# Patient Record
Sex: MALE | Race: WHITE | Employment: FULL TIME | URBAN - METROPOLITAN AREA
[De-identification: names, ages, dates, MRNs, and addresses within clinical notes are randomized per-mention and may not be internally consistent; named-entity substitution may affect disease eponyms.]

---

## 2017-09-05 ENCOUNTER — OFFICE VISIT (OUTPATIENT)
Dept: OTHER | Facility: HOSPITAL | Age: 22
End: 2017-09-05
Attending: ORTHOPAEDIC SURGERY

## 2017-09-05 DIAGNOSIS — Z00.00 ROUTINE GENERAL MEDICAL EXAMINATION AT A HEALTH CARE FACILITY: Primary | ICD-10-CM

## 2017-09-05 LAB
ALBUMIN SERPL BCP-MCNC: 4.5 G/DL (ref 3.5–4.8)
ALBUMIN/GLOB SERPL: 1.5 {RATIO} (ref 1–2)
ALP SERPL-CCNC: 76 U/L (ref 32–100)
ALT SERPL-CCNC: 26 U/L (ref 17–63)
ANION GAP SERPL CALC-SCNC: 8 MMOL/L (ref 0–18)
AST SERPL-CCNC: 25 U/L (ref 15–41)
BASOPHILS # BLD: 0 K/UL (ref 0–0.2)
BASOPHILS NFR BLD: 1 %
BILIRUB SERPL-MCNC: 1.1 MG/DL (ref 0.3–1.2)
BILIRUB UR QL: NEGATIVE
BUN SERPL-MCNC: 14 MG/DL (ref 8–20)
BUN/CREAT SERPL: 10.4 (ref 10–20)
CALCIUM SERPL-MCNC: 9.7 MG/DL (ref 8.5–10.5)
CHLORIDE SERPL-SCNC: 104 MMOL/L (ref 95–110)
CHOLEST SERPL-MCNC: 165 MG/DL (ref 110–200)
CLARITY UR: CLEAR
CO2 SERPL-SCNC: 25 MMOL/L (ref 22–32)
COLOR UR: YELLOW
CREAT SERPL-MCNC: 1.35 MG/DL (ref 0.5–1.5)
EOSINOPHIL # BLD: 0.2 K/UL (ref 0–0.7)
EOSINOPHIL NFR BLD: 4 %
ERYTHROCYTE [DISTWIDTH] IN BLOOD BY AUTOMATED COUNT: 16.5 % (ref 11–15)
GLOBULIN PLAS-MCNC: 3 G/DL (ref 2.5–3.7)
GLUCOSE SERPL-MCNC: 102 MG/DL (ref 70–99)
GLUCOSE UR-MCNC: NEGATIVE MG/DL
HCT VFR BLD AUTO: 46.6 % (ref 41–52)
HDLC SERPL-MCNC: 59 MG/DL
HGB BLD-MCNC: 15.7 G/DL (ref 13.5–17.5)
HGB UR QL STRIP.AUTO: NEGATIVE
KETONES UR-MCNC: NEGATIVE MG/DL
LDLC SERPL CALC-MCNC: 85 MG/DL (ref 0–99)
LEUKOCYTE ESTERASE UR QL STRIP.AUTO: NEGATIVE
LYMPHOCYTES # BLD: 1.6 K/UL (ref 1–4)
LYMPHOCYTES NFR BLD: 26 %
MCH RBC QN AUTO: 27.5 PG (ref 27–32)
MCHC RBC AUTO-ENTMCNC: 33.6 G/DL (ref 32–37)
MCV RBC AUTO: 82 FL (ref 80–100)
MONOCYTES # BLD: 0.6 K/UL (ref 0–1)
MONOCYTES NFR BLD: 11 %
NEUTROPHILS # BLD AUTO: 3.6 K/UL (ref 1.8–7.7)
NEUTROPHILS NFR BLD: 59 %
NITRITE UR QL STRIP.AUTO: NEGATIVE
NONHDLC SERPL-MCNC: 106 MG/DL
OSMOLALITY UR CALC.SUM OF ELEC: 285 MOSM/KG (ref 275–295)
PH UR: 6 [PH] (ref 5–8)
PLATELET # BLD AUTO: 211 K/UL (ref 140–400)
PMV BLD AUTO: 7.6 FL (ref 7.4–10.3)
POTASSIUM SERPL-SCNC: 4.4 MMOL/L (ref 3.3–5.1)
PROT SERPL-MCNC: 7.5 G/DL (ref 5.9–8.4)
PROT UR-MCNC: NEGATIVE MG/DL
RBC # BLD AUTO: 5.68 M/UL (ref 4.5–5.9)
SODIUM SERPL-SCNC: 137 MMOL/L (ref 136–144)
SP GR UR STRIP: 1 (ref 1–1.03)
TRIGL SERPL-MCNC: 106 MG/DL (ref 1–149)
UROBILINOGEN UR STRIP-ACNC: <2
VIT C UR-MCNC: NEGATIVE MG/DL
WBC # BLD AUTO: 6 K/UL (ref 4–11)

## 2017-09-05 PROCEDURE — 80053 COMPREHEN METABOLIC PANEL: CPT

## 2017-09-05 PROCEDURE — 85025 COMPLETE CBC W/AUTO DIFF WBC: CPT

## 2017-09-05 PROCEDURE — 80061 LIPID PANEL: CPT

## 2017-09-05 PROCEDURE — 81003 URINALYSIS AUTO W/O SCOPE: CPT

## 2018-08-13 ENCOUNTER — HOSPITAL (OUTPATIENT)
Dept: OTHER | Age: 23
End: 2018-08-13
Attending: FAMILY MEDICINE

## 2020-02-05 ENCOUNTER — OFFICE VISIT (OUTPATIENT)
Dept: FAMILY MEDICINE CLINIC | Facility: CLINIC | Age: 25
End: 2020-02-05
Payer: COMMERCIAL

## 2020-02-05 VITALS
WEIGHT: 207 LBS | BODY MASS INDEX: 28.98 KG/M2 | HEIGHT: 71 IN | DIASTOLIC BLOOD PRESSURE: 62 MMHG | HEART RATE: 59 BPM | SYSTOLIC BLOOD PRESSURE: 102 MMHG

## 2020-02-05 DIAGNOSIS — J35.01 CHRONIC TONSILLITIS: ICD-10-CM

## 2020-02-05 DIAGNOSIS — R06.83 SNORING: ICD-10-CM

## 2020-02-05 DIAGNOSIS — Z00.00 WELL ADULT EXAM: Primary | ICD-10-CM

## 2020-02-05 PROCEDURE — 90686 IIV4 VACC NO PRSV 0.5 ML IM: CPT | Performed by: NURSE PRACTITIONER

## 2020-02-05 PROCEDURE — 99385 PREV VISIT NEW AGE 18-39: CPT | Performed by: NURSE PRACTITIONER

## 2020-02-05 PROCEDURE — 90471 IMMUNIZATION ADMIN: CPT | Performed by: NURSE PRACTITIONER

## 2020-02-05 NOTE — PATIENT INSTRUCTIONS
Prevention Guidelines, Men Ages 25 to 44  Screening tests and vaccines are an important part of managing your health. A screening test is done to find possible disorders or diseases in people who don't have any symptoms.  The goal is to find a disease ear Vaccines Who needs it How often   Chickenpox (varicella) All men in this age group who have no record of this infection or vaccine 2 doses; the second dose should be given at least 4 weeks after the first dose   Hepatitis A Men at increased risk for infect Sexually transmitted infection prevention Men who are sexually active At routine exams   Skin cancer Prevention of skin cancer in fair-skinned adults through age 25 At routine exams   1Those who are 25years of age, who are not up-to-date on their childhoo Too much weight can make snoring worse. Extra weight puts pressure on your neck tissues and lungs, making breathing harder.  If you're overweight, ask your healthcare provider about a weight-loss program.  Exercise regularly  Exercise can help you lose weig

## 2020-02-05 NOTE — PROGRESS NOTES
HPI  Pt here to establish care. Has h/o snoring over the past couple of years-not every day. Most days wakes up feeling tired. Will wake up at times gasping. No other health problems or concerns.    Had lasik on eyes    Diet-has been working on a healt Occupation: student    Social Needs      Financial resource strain: Not on file      Food insecurity:        Worry: Not on file        Inability: Not on file      Transportation needs:        Medical: Not on file        Non-medical: Not on file    Tobacco cerumen present  Left Ear: Tympanic membrane and ear canal normal. No cerumen present  Mouth/Throat: Uvula is midline. No oropharyngeal exudate, posterior oropharyngeal erythema or tonsillar abscesses.    Tonsils 3+ bilat   Eyes: Pupils are equal, round, an to call with questions or concerns. Encouraged to sign up for My Chart if not already registered.

## 2020-02-05 NOTE — ASSESSMENT & PLAN NOTE
Had full labs and stress test done July 2019  Please aim to eat a diet high in fresh fruits and vegetables, lean protein sources, complex carbohydrates and limited processed and fast foods.   Try to get at least 150 minutes of exercise per week-a combinatio

## 2020-10-21 PROCEDURE — 88304 TISSUE EXAM BY PATHOLOGIST: CPT | Performed by: OTOLARYNGOLOGY

## 2023-09-30 ENCOUNTER — HOSPITAL ENCOUNTER (EMERGENCY)
Age: 28
Discharge: HOME | End: 2023-10-01
Attending: EMERGENCY MEDICINE

## 2023-09-30 ENCOUNTER — APPOINTMENT (OUTPATIENT)
Dept: GENERAL RADIOLOGY | Age: 28
End: 2023-09-30
Attending: EMERGENCY MEDICINE

## 2023-09-30 DIAGNOSIS — S93.401A SPRAIN OF RIGHT ANKLE, UNSPECIFIED LIGAMENT, INITIAL ENCOUNTER: Primary | ICD-10-CM

## 2023-09-30 PROCEDURE — 73610 X-RAY EXAM OF ANKLE: CPT

## 2023-09-30 PROCEDURE — 99283 EMERGENCY DEPT VISIT LOW MDM: CPT

## 2023-09-30 ASSESSMENT — PAIN SCALES - GENERAL: PAINLEVEL_OUTOF10: 7

## 2023-09-30 ASSESSMENT — PAIN DESCRIPTION - PAIN TYPE: TYPE: ACUTE PAIN

## 2023-10-01 VITALS
WEIGHT: 192.9 LBS | SYSTOLIC BLOOD PRESSURE: 123 MMHG | RESPIRATION RATE: 16 BRPM | BODY MASS INDEX: 36.42 KG/M2 | HEIGHT: 61 IN | DIASTOLIC BLOOD PRESSURE: 72 MMHG | TEMPERATURE: 97.2 F | OXYGEN SATURATION: 100 % | HEART RATE: 72 BPM

## 2023-10-01 PROBLEM — J35.01 CHRONIC TONSILLITIS: Status: ACTIVE | Noted: 2020-02-05

## 2023-10-01 PROBLEM — R06.83 SNORING: Status: ACTIVE | Noted: 2020-02-05

## 2023-10-01 PROCEDURE — 99283 EMERGENCY DEPT VISIT LOW MDM: CPT | Performed by: EMERGENCY MEDICINE

## 2023-10-01 PROCEDURE — 10002803 HB RX 637: Performed by: EMERGENCY MEDICINE

## 2023-10-01 RX ORDER — IBUPROFEN 200 MG
600 TABLET ORAL ONCE
Status: COMPLETED | OUTPATIENT
Start: 2023-10-01 | End: 2023-10-01

## 2023-10-01 RX ADMIN — IBUPROFEN 600 MG: 200 TABLET, FILM COATED ORAL at 00:22

## 2023-10-01 ASSESSMENT — PAIN SCALES - GENERAL: PAINLEVEL_OUTOF10: 7

## 2024-04-10 ENCOUNTER — APPOINTMENT (OUTPATIENT)
Dept: GENERAL RADIOLOGY | Age: 29
End: 2024-04-10

## 2024-04-10 ENCOUNTER — HOSPITAL ENCOUNTER (EMERGENCY)
Age: 29
Discharge: HOME | End: 2024-04-10
Attending: EMERGENCY MEDICINE

## 2024-04-10 VITALS
TEMPERATURE: 98 F | SYSTOLIC BLOOD PRESSURE: 138 MMHG | RESPIRATION RATE: 16 BRPM | BODY MASS INDEX: 26.9 KG/M2 | HEART RATE: 69 BPM | HEIGHT: 71 IN | DIASTOLIC BLOOD PRESSURE: 77 MMHG | OXYGEN SATURATION: 99 %

## 2024-04-10 DIAGNOSIS — S61.210A LACERATION OF RIGHT INDEX FINGER WITHOUT FOREIGN BODY WITHOUT DAMAGE TO NAIL, INITIAL ENCOUNTER: Primary | ICD-10-CM

## 2024-04-10 PROCEDURE — 73140 X-RAY EXAM OF FINGER(S): CPT

## 2024-04-10 PROCEDURE — 10002800 HB RX 250 W HCPCS: Performed by: PHYSICIAN ASSISTANT

## 2024-04-10 PROCEDURE — 10002801 HB RX 250 W/O HCPCS

## 2024-04-10 PROCEDURE — 90715 TDAP VACCINE 7 YRS/> IM: CPT | Performed by: PHYSICIAN ASSISTANT

## 2024-04-10 PROCEDURE — 90471 IMMUNIZATION ADMIN: CPT | Performed by: PHYSICIAN ASSISTANT

## 2024-04-10 RX ORDER — LIDOCAINE HYDROCHLORIDE 10 MG/ML
INJECTION, SOLUTION INFILTRATION; PERINEURAL
Status: COMPLETED
Start: 2024-04-10 | End: 2024-04-10

## 2024-04-10 RX ADMIN — TETANUS TOXOID, REDUCED DIPHTHERIA TOXOID AND ACELLULAR PERTUSSIS VACCINE, ADSORBED 0.5 ML: 5; 2.5; 8; 8; 2.5 SUSPENSION INTRAMUSCULAR at 19:26

## 2024-04-10 RX ADMIN — LIDOCAINE HYDROCHLORIDE 10 ML: 10 INJECTION, SOLUTION INFILTRATION; PERINEURAL at 19:05

## 2024-04-10 ASSESSMENT — ENCOUNTER SYMPTOMS
WEAKNESS: 0
NUMBNESS: 0

## 2024-04-12 ENCOUNTER — CASE MANAGEMENT (OUTPATIENT)
Dept: OCCUPATIONAL MEDICINE | Age: 29
End: 2024-04-12

## 2024-04-19 ENCOUNTER — WORKER'S COMP (OUTPATIENT)
Dept: OCCUPATIONAL MEDICINE | Age: 29
End: 2024-04-19

## 2024-04-19 VITALS
DIASTOLIC BLOOD PRESSURE: 60 MMHG | TEMPERATURE: 99 F | WEIGHT: 192 LBS | HEIGHT: 71 IN | SYSTOLIC BLOOD PRESSURE: 100 MMHG | OXYGEN SATURATION: 99 % | BODY MASS INDEX: 26.88 KG/M2 | RESPIRATION RATE: 16 BRPM | HEART RATE: 52 BPM

## 2024-04-19 DIAGNOSIS — S61.210A LACERATION OF RIGHT INDEX FINGER WITHOUT FOREIGN BODY WITHOUT DAMAGE TO NAIL, INITIAL ENCOUNTER: Primary | ICD-10-CM

## 2024-04-19 ASSESSMENT — ENCOUNTER SYMPTOMS: WOUND: 1

## 2024-04-19 ASSESSMENT — PAIN SCALES - GENERAL: PAINLEVEL: 0

## 2024-04-26 ENCOUNTER — APPOINTMENT (OUTPATIENT)
Dept: OCCUPATIONAL MEDICINE | Age: 29
End: 2024-04-26

## 2024-04-26 VITALS
WEIGHT: 192 LBS | OXYGEN SATURATION: 98 % | RESPIRATION RATE: 16 BRPM | TEMPERATURE: 97.2 F | SYSTOLIC BLOOD PRESSURE: 102 MMHG | DIASTOLIC BLOOD PRESSURE: 62 MMHG | BODY MASS INDEX: 26.88 KG/M2 | HEIGHT: 71 IN | HEART RATE: 78 BPM

## 2024-04-26 DIAGNOSIS — S61.210D LACERATION OF RIGHT INDEX FINGER WITHOUT FOREIGN BODY WITHOUT DAMAGE TO NAIL, SUBSEQUENT ENCOUNTER: Primary | ICD-10-CM

## 2024-04-26 ASSESSMENT — PAIN SCALES - GENERAL: PAINLEVEL: 0

## 2024-04-29 ENCOUNTER — OFFICE VISIT (OUTPATIENT)
Dept: INTERNAL MEDICINE CLINIC | Facility: CLINIC | Age: 29
End: 2024-04-29
Payer: COMMERCIAL

## 2024-04-29 VITALS
DIASTOLIC BLOOD PRESSURE: 78 MMHG | WEIGHT: 181.81 LBS | TEMPERATURE: 98 F | BODY MASS INDEX: 26.03 KG/M2 | RESPIRATION RATE: 12 BRPM | SYSTOLIC BLOOD PRESSURE: 104 MMHG | HEIGHT: 70.25 IN | HEART RATE: 55 BPM | OXYGEN SATURATION: 100 %

## 2024-04-29 DIAGNOSIS — Z00.00 PREVENTATIVE HEALTH CARE: Primary | ICD-10-CM

## 2024-04-29 PROBLEM — S61.210A LACERATION OF RIGHT INDEX FINGER WITHOUT FOREIGN BODY WITHOUT DAMAGE TO NAIL: Status: RESOLVED | Noted: 2024-04-19 | Resolved: 2024-04-29

## 2024-04-29 PROBLEM — S61.210A LACERATION OF RIGHT INDEX FINGER WITHOUT FOREIGN BODY WITHOUT DAMAGE TO NAIL: Status: ACTIVE | Noted: 2024-04-19

## 2024-04-29 PROBLEM — J35.01 CHRONIC TONSILLITIS: Status: RESOLVED | Noted: 2020-02-05 | Resolved: 2024-04-29

## 2024-04-29 NOTE — PATIENT INSTRUCTIONS
- Get blood tests done when fasting (water and medications only for 8 hours).  You can schedule a lab appointment online through [employer] or rosi call central scheduling at [phone]  - When you have your work physical this summer you can let them know you had lab tests done through us (they may still want to repeat the labs, but as [facility] is [facility] of [facility] they can see our lab results).    It was a pleasure seeing you in the clinic today.  Thank you for choosing the [facility] office for your healthcare needs. Please call at [phone] with any questions or concerns.    Bonita Bender MD

## 2024-04-29 NOTE — PROGRESS NOTES
Ken Suarez Jr. is a 28 year old male.   HPI:     Chief Complaint   Patient presents with    New Patient    Establish Care    CPX     Patient presents for CPX/wellness examination and to establish care.  Diet: Generally pretty healthy  Exercise: Generally active, regular exercise  Vision: Wears contacts - had eye exam July 2023  Dental: Last cleaning was a week ago    Acute issues:  Had a fingernail laceration at work this month.  Saw occupational health clinic through [facility].  Did get tetanus shot from there this month.    Chronic issues:  None/not on any prescription medications.    Past medical, family, surgical and social history were reviewed and updated in chart.  REVIEW OF SYSTEMS:   GENERAL/ const: no fevers/chills, no unintentional weight loss  SKIN: denies any unusual skin lesions  EYES:no vision problems  HEENT: denies sinus pain or sinus tenderness  LUNGS: denies shortness of breath   CARDIOVASCULAR: denies chest pain  GI: denies nausea/emesis/ abdominal pain diarrhea constipation  : denies dysuria   MUSCULOSKELETAL: no arthralgias  NEURO: denies headaches  PSYCHIATRIC: denies issues  ENDOCRINE: no hot/cold intolerance  ALLERGY: No Known Allergies  PAST HISTORY:   No current outpatient medications on file.  Medical:  has a past medical history of Chronic tonsillitis (02/05/2020) and Laceration of right index finger without foreign body without damage to nail (04/19/2024).  Surgical:  has a past surgical history that includes LASIK and tonsillectomy (2020).  Family: family history is not on file.  Social:  reports that he has never smoked. He has never used smokeless tobacco. He reports current alcohol use. He reports that he does not use drugs.  Wt Readings from Last 6 Encounters:   04/29/24 181 lb 12.8 oz (82.5 kg)   10/21/20 206 lb (93.4 kg)   02/05/20 207 lb (93.9 kg)   02/15/16 190 lb (86.2 kg)   08/22/13 164 lb (74.4 kg) (72%, Z= 0.58)*   02/21/13 166 lb (75.3 kg) (77%, Z= 0.73)*     * Growth  percentiles are based on CDC (Boys, 2-20 Years) data.     EXAM:   /78 (BP Location: Right arm, Patient Position: Sitting, Cuff Size: large)   Pulse 55   Temp 98 °F (36.7 °C) (Temporal)   Resp 12   Ht 5' 10.25\" (1.784 m)   Wt 181 lb 12.8 oz (82.5 kg)   SpO2 100%   BMI 25.90 kg/m²   GENERAL: Alert and oriented, well developed, well nourished,in no apparent distress  SKIN: no rashes,no suspicious lesions  HEENT: atraumatic, PERRLA, EOMI, normal lid and conjunctiva, normal external canals and tympanic membranes bilaterally  NECK: supple, no jvd, no thyromegaly, no palpable/tender cervical lymphadenopathy  LUNGS: clear to auscultation bilaterally, no wheezing/rubs  CARDIO: RRR without murmurs.  No clubbing, cyanosis or edema.  GI: soft non tender nondistended no hepatosplenomegaly, bowel sounds throughout  NEURO: CN II-XII intact, 5/5 strength all extremities  MS: Full ROM, no joint pain  PSYCH: pleasant, appropriate mood and affect  ASSESSMENT AND PLAN:   1.  Encounter for preventative adult health examination  Age appropriate health guidance and counseling provided.  Screening labs ordered as below.  Body mass index is 25.9 kg/m².  He does get labs/work physical every summer, creatinine was slightly high last July.  Advised patient to do labs when fasting, no supplements/shakes beforehand.  - CBC With Differential With Platelet; Future  - Comp Metabolic Panel (14); Future  - Hemoglobin A1C; Future  - Lipid Panel; Future    Patient Care Team:  Judah Lacy MD as PCP - General (Internal Medicine)  The patient indicates understanding of these issues and agrees to the plan.  The patient is asked to return to clinic in 12 months for follow up on chronic issues/CPX, or earlier if acute issues arise.    Bonita Bender MD

## 2024-05-02 ENCOUNTER — APPOINTMENT (OUTPATIENT)
Dept: OCCUPATIONAL MEDICINE | Age: 29
End: 2024-05-02

## 2024-05-02 VITALS
OXYGEN SATURATION: 98 % | HEIGHT: 71 IN | RESPIRATION RATE: 16 BRPM | SYSTOLIC BLOOD PRESSURE: 112 MMHG | WEIGHT: 192 LBS | HEART RATE: 68 BPM | DIASTOLIC BLOOD PRESSURE: 70 MMHG | BODY MASS INDEX: 26.88 KG/M2 | TEMPERATURE: 98 F

## 2024-05-02 DIAGNOSIS — S61.210D LACERATION OF RIGHT INDEX FINGER WITHOUT FOREIGN BODY WITHOUT DAMAGE TO NAIL, SUBSEQUENT ENCOUNTER: Primary | ICD-10-CM

## 2024-05-02 PROCEDURE — 99213 OFFICE O/P EST LOW 20 MIN: CPT | Performed by: NURSE PRACTITIONER

## 2024-05-02 ASSESSMENT — PAIN SCALES - GENERAL: PAINLEVEL: 0

## 2024-05-03 ENCOUNTER — MED REC SCAN ONLY (OUTPATIENT)
Dept: INTERNAL MEDICINE CLINIC | Facility: CLINIC | Age: 29
End: 2024-05-03

## 2024-06-21 ENCOUNTER — LAB ENCOUNTER (OUTPATIENT)
Dept: LAB | Age: 29
End: 2024-06-21
Attending: INTERNAL MEDICINE

## 2024-06-21 DIAGNOSIS — Z00.00 PREVENTATIVE HEALTH CARE: ICD-10-CM

## 2024-06-21 LAB
ALBUMIN SERPL-MCNC: 4.8 G/DL (ref 3.2–4.8)
ALBUMIN/GLOB SERPL: 1.8 {RATIO} (ref 1–2)
ALP LIVER SERPL-CCNC: 75 U/L
ALT SERPL-CCNC: 33 U/L
ANION GAP SERPL CALC-SCNC: 8 MMOL/L (ref 0–18)
AST SERPL-CCNC: 36 U/L (ref ?–34)
BASOPHILS # BLD AUTO: 0.04 X10(3) UL (ref 0–0.2)
BASOPHILS NFR BLD AUTO: 0.4 %
BILIRUB SERPL-MCNC: 1 MG/DL (ref 0.3–1.2)
BUN BLD-MCNC: 28 MG/DL (ref 9–23)
BUN/CREAT SERPL: 17.8 (ref 10–20)
CALCIUM BLD-MCNC: 10.2 MG/DL (ref 8.7–10.4)
CHLORIDE SERPL-SCNC: 106 MMOL/L (ref 98–112)
CHOLEST SERPL-MCNC: 167 MG/DL (ref ?–200)
CO2 SERPL-SCNC: 27 MMOL/L (ref 21–32)
CREAT BLD-MCNC: 1.57 MG/DL
DEPRECATED RDW RBC AUTO: 38.5 FL (ref 35.1–46.3)
EGFRCR SERPLBLD CKD-EPI 2021: 61 ML/MIN/1.73M2 (ref 60–?)
EOSINOPHIL # BLD AUTO: 0.11 X10(3) UL (ref 0–0.7)
EOSINOPHIL NFR BLD AUTO: 1.2 %
ERYTHROCYTE [DISTWIDTH] IN BLOOD BY AUTOMATED COUNT: 12.4 % (ref 11–15)
EST. AVERAGE GLUCOSE BLD GHB EST-MCNC: 108 MG/DL (ref 68–126)
FASTING PATIENT LIPID ANSWER: NO
FASTING STATUS PATIENT QL REPORTED: NO
GLOBULIN PLAS-MCNC: 2.7 G/DL (ref 2–3.5)
GLUCOSE BLD-MCNC: 64 MG/DL (ref 70–99)
HBA1C MFR BLD: 5.4 % (ref ?–5.7)
HCT VFR BLD AUTO: 43.7 %
HDLC SERPL-MCNC: 67 MG/DL (ref 40–59)
HGB BLD-MCNC: 15.4 G/DL
IMM GRANULOCYTES # BLD AUTO: 0.03 X10(3) UL (ref 0–1)
IMM GRANULOCYTES NFR BLD: 0.3 %
LDLC SERPL CALC-MCNC: 78 MG/DL (ref ?–100)
LYMPHOCYTES # BLD AUTO: 1.77 X10(3) UL (ref 1–4)
LYMPHOCYTES NFR BLD AUTO: 18.8 %
MCH RBC QN AUTO: 30.1 PG (ref 26–34)
MCHC RBC AUTO-ENTMCNC: 35.2 G/DL (ref 31–37)
MCV RBC AUTO: 85.5 FL
MONOCYTES # BLD AUTO: 0.83 X10(3) UL (ref 0.1–1)
MONOCYTES NFR BLD AUTO: 8.8 %
NEUTROPHILS # BLD AUTO: 6.66 X10 (3) UL (ref 1.5–7.7)
NEUTROPHILS # BLD AUTO: 6.66 X10(3) UL (ref 1.5–7.7)
NEUTROPHILS NFR BLD AUTO: 70.5 %
NONHDLC SERPL-MCNC: 100 MG/DL (ref ?–130)
OSMOLALITY SERPL CALC.SUM OF ELEC: 296 MOSM/KG (ref 275–295)
PLATELET # BLD AUTO: 248 10(3)UL (ref 150–450)
POTASSIUM SERPL-SCNC: 4.3 MMOL/L (ref 3.5–5.1)
PROT SERPL-MCNC: 7.5 G/DL (ref 5.7–8.2)
RBC # BLD AUTO: 5.11 X10(6)UL
SODIUM SERPL-SCNC: 141 MMOL/L (ref 136–145)
TRIGL SERPL-MCNC: 130 MG/DL (ref 30–149)
VLDLC SERPL CALC-MCNC: 20 MG/DL (ref 0–30)
WBC # BLD AUTO: 9.4 X10(3) UL (ref 4–11)

## 2024-06-21 PROCEDURE — 85025 COMPLETE CBC W/AUTO DIFF WBC: CPT | Performed by: INTERNAL MEDICINE

## 2024-06-21 PROCEDURE — 83036 HEMOGLOBIN GLYCOSYLATED A1C: CPT | Performed by: INTERNAL MEDICINE

## 2024-06-21 PROCEDURE — 80053 COMPREHEN METABOLIC PANEL: CPT | Performed by: INTERNAL MEDICINE

## 2024-06-21 PROCEDURE — 80061 LIPID PANEL: CPT | Performed by: INTERNAL MEDICINE

## 2024-06-24 DIAGNOSIS — R79.89 ELEVATED SERUM CREATININE: Primary | ICD-10-CM

## 2024-06-24 PROBLEM — R74.8 ELEVATED CREATINE KINASE: Status: ACTIVE | Noted: 2024-06-24

## 2025-06-07 ENCOUNTER — OFFICE VISIT (OUTPATIENT)
Dept: INTERNAL MEDICINE CLINIC | Facility: CLINIC | Age: 30
End: 2025-06-07
Payer: COMMERCIAL

## 2025-06-07 VITALS
HEIGHT: 70 IN | RESPIRATION RATE: 12 BRPM | OXYGEN SATURATION: 99 % | DIASTOLIC BLOOD PRESSURE: 64 MMHG | TEMPERATURE: 98 F | BODY MASS INDEX: 25.91 KG/M2 | HEART RATE: 40 BPM | WEIGHT: 181 LBS | SYSTOLIC BLOOD PRESSURE: 114 MMHG

## 2025-06-07 DIAGNOSIS — I34.0 MITRAL VALVE INSUFFICIENCY, UNSPECIFIED ETIOLOGY: ICD-10-CM

## 2025-06-07 DIAGNOSIS — I37.1 PULMONARY VALVE INSUFFICIENCY, UNSPECIFIED ETIOLOGY: Primary | ICD-10-CM

## 2025-06-07 DIAGNOSIS — N13.30 HYDRONEPHROSIS OF LEFT KIDNEY: ICD-10-CM

## 2025-06-07 PROCEDURE — 3078F DIAST BP <80 MM HG: CPT | Performed by: INTERNAL MEDICINE

## 2025-06-07 PROCEDURE — 3074F SYST BP LT 130 MM HG: CPT | Performed by: INTERNAL MEDICINE

## 2025-06-07 PROCEDURE — 3008F BODY MASS INDEX DOCD: CPT | Performed by: INTERNAL MEDICINE

## 2025-06-07 PROCEDURE — 99213 OFFICE O/P EST LOW 20 MIN: CPT | Performed by: INTERNAL MEDICINE

## 2025-06-07 NOTE — PATIENT INSTRUCTIONS
- Will monitor heart valves for now.  You have very mild regurgitation (backflow) in some of the valves.  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    - Let us know if you develop shortness of breath, chest pain, or leg swelling  - Otherwise will check an echocardiogram (heart ultrasound) in 2-3 years to monitor    - For the hydronephrosis, we will check a formal kidney ultrasound.  Call central scheduling at [phone] to schedule.  If the hydronephrosis is still there, we will refer you to our Urology clinics.    It was a pleasure seeing you in the clinic today.  Thank you for choosing the [facility] [place] office for your healthcare needs. Please call at [phone] with any questions or concerns.    Bonita Bender MD

## 2025-06-07 NOTE — PROGRESS NOTES
Ken Suarez Jr. is a 29 year old male.   HPI:     Patient presents to discuss several issues.  He had a preventative ultrasound done for work - showed possible mitral valve regurgitation, hydronephrosis of the kidney.    Patient denies shortness of breath, chest pain, or leg swelling.   He does urinate a lot but he drinks a gallon of water a day.      Past medical, family, surgical and social history were reviewed as listed in the chart, and are unchanged from previous visit.    REVIEW OF SYSTEMS:   GENERAL/ const: no fevers/chills, no unintentional weight loss  EYES:no vision problems  HEENT: denies sinus pain or sinus tenderness  LUNGS: denies shortness of breath   CARDIOVASCULAR: denies chest pain  GI: denies nausea/emesis/ abdominal pain diarrhea constipation  : denies dysuria   NEURO: denies headaches  PSYCHIATRIC: denies issues  ENDOCRINE: no hot/cold intolerance  ALLERGY: Allergies[1]  PAST HISTORY:     Medications - Current[2]  Medical:  has a past medical history of Chronic tonsillitis (02/05/2020) and Laceration of right index finger without foreign body without damage to nail (04/19/2024).  Surgical:  has a past surgical history that includes LASIK and tonsillectomy (2020).  Family: family history is not on file.  Social:  reports that he has never smoked. He has never used smokeless tobacco. He reports current alcohol use. He reports that he does not use drugs.  Wt Readings from Last 6 Encounters:   06/07/25 181 lb (82.1 kg)   04/29/24 181 lb 12.8 oz (82.5 kg)   10/21/20 206 lb (93.4 kg)   02/05/20 207 lb (93.9 kg)   02/15/16 190 lb (86.2 kg)   08/22/13 164 lb (74.4 kg) (72%, Z= 0.58)*     * Growth percentiles are based on CDC (Boys, 2-20 Years) data.       EXAM:   /64 (BP Location: Left arm, Patient Position: Sitting, Cuff Size: large)   Pulse (!) 40   Temp 98.2 °F (36.8 °C) (Temporal)   Resp 12   Ht 5' 10\" (1.778 m)   Wt 181 lb (82.1 kg)   SpO2 99%   BMI 25.97 kg/m²   GENERAL: Alert and  oriented, well developed, well nourished,in no apparent distress  HEENT: atraumatic, PERRLA, EOMI, normal lid and conjunctiva  LUNGS: clear to auscultation bilaterally, no wheezing/rubs  CARDIO: RRR without murmurs.  No clubbing, cyanosis or edema.  GI: soft non tender nondistended no hepatosplenomegaly, bowel sounds throughout  NEURO: CN II-XII intact, 5/5 strength all extremities  PSYCH: pleasant, appropriate mood and affect  ASSESSMENT AND PLAN:   1. Pulmonary valve insufficiency, unspecified etiology  2. Mitral valve insufficiency, unspecified etiology  Noted on work screening ultrasound.  No audible murmur to auscultation today.  Patient with no chest pain or shortness of breath.  No lower extremity edema.  Suspect these findings could be congenital.  Will monitor for now, check echocardiogram in 2-3 years.  Advised patient to contact us/follow up if he develops chest pain, shortness of breath, or lower extremity edema.    3. Hydronephrosis of left kidney  Noted on work screening ultrasound.  Will check formal ultrasound; if this re-demonstrates hydronephrosis will refer patient to Urology.  - US KIDNEYS (CPT=76775); Future    Patient Care Team:  Bonita Bender MD as PCP - General (Internal Medicine)  The patient indicates understanding of these issues and agrees to the plan.  The patient is asked to return to clinic in 6-12 months for follow up on chronic issues/CPX, or earlier if acute issues arise.    Bonita Bender MD           [1] No Known Allergies  [2] No current outpatient medications on file.

## 2025-07-01 ENCOUNTER — HOSPITAL ENCOUNTER (OUTPATIENT)
Dept: ULTRASOUND IMAGING | Age: 30
Discharge: HOME | End: 2025-07-01
Attending: INTERNAL MEDICINE
Payer: COMMERCIAL

## 2025-07-01 DIAGNOSIS — N13.30 HYDRONEPHROSIS OF LEFT KIDNEY: ICD-10-CM

## 2025-07-01 PROCEDURE — 76775 US EXAM ABDO BACK WALL LIM: CPT | Performed by: INTERNAL MEDICINE

## 2025-07-24 ENCOUNTER — APPOINTMENT (OUTPATIENT)
Dept: CT IMAGING | Age: 30
End: 2025-07-24
Attending: EMERGENCY MEDICINE

## 2025-07-24 ENCOUNTER — HOSPITAL ENCOUNTER (EMERGENCY)
Age: 30
Discharge: HOME | End: 2025-07-24
Attending: EMERGENCY MEDICINE

## 2025-07-24 VITALS
WEIGHT: 175 LBS | TEMPERATURE: 97.9 F | RESPIRATION RATE: 16 BRPM | SYSTOLIC BLOOD PRESSURE: 138 MMHG | HEART RATE: 72 BPM | HEIGHT: 71 IN | BODY MASS INDEX: 24.5 KG/M2 | OXYGEN SATURATION: 98 % | DIASTOLIC BLOOD PRESSURE: 59 MMHG

## 2025-07-24 DIAGNOSIS — S39.012A STRAIN OF LUMBAR REGION, INITIAL ENCOUNTER: Primary | ICD-10-CM

## 2025-07-24 DIAGNOSIS — M51.369 BULGING OF LUMBAR INTERVERTEBRAL DISC: ICD-10-CM

## 2025-07-24 DIAGNOSIS — M51.26 LUMBAR DISC HERNIATION: ICD-10-CM

## 2025-07-24 PROCEDURE — A9150 MISC/EXPER NON-PRESCRIPT DRU: HCPCS | Performed by: EMERGENCY MEDICINE

## 2025-07-24 PROCEDURE — 99284 EMERGENCY DEPT VISIT MOD MDM: CPT | Performed by: EMERGENCY MEDICINE

## 2025-07-24 PROCEDURE — 72131 CT LUMBAR SPINE W/O DYE: CPT

## 2025-07-24 PROCEDURE — 10002803 HB RX 637: Performed by: EMERGENCY MEDICINE

## 2025-07-24 RX ORDER — TIZANIDINE HYDROCHLORIDE 2 MG/1
2 CAPSULE, GELATIN COATED ORAL 3 TIMES DAILY PRN
Qty: 15 CAPSULE | Refills: 0 | Status: SHIPPED | OUTPATIENT
Start: 2025-07-24 | End: 2025-07-24

## 2025-07-24 RX ORDER — IBUPROFEN 200 MG
400 TABLET ORAL ONCE
Status: COMPLETED | OUTPATIENT
Start: 2025-07-24 | End: 2025-07-24

## 2025-07-24 RX ORDER — LIDOCAINE 50 MG/G
1 PATCH TOPICAL EVERY 24 HOURS
Qty: 7 PATCH | Refills: 0 | Status: SHIPPED | OUTPATIENT
Start: 2025-07-24 | End: 2025-07-31

## 2025-07-24 RX ORDER — LIDOCAINE 50 MG/G
1 PATCH TOPICAL EVERY 24 HOURS
Qty: 7 PATCH | Refills: 0 | Status: SHIPPED | OUTPATIENT
Start: 2025-07-24 | End: 2025-07-24

## 2025-07-24 RX ORDER — IBUPROFEN 600 MG/1
600 TABLET, FILM COATED ORAL EVERY 6 HOURS PRN
Qty: 28 TABLET | Refills: 0 | Status: SHIPPED | OUTPATIENT
Start: 2025-07-24 | End: 2025-07-24

## 2025-07-24 RX ORDER — IBUPROFEN 600 MG/1
600 TABLET, FILM COATED ORAL EVERY 6 HOURS PRN
Qty: 28 TABLET | Refills: 0 | Status: SHIPPED | OUTPATIENT
Start: 2025-07-24 | End: 2025-07-31

## 2025-07-24 RX ORDER — LIDOCAINE 4 G/G
1 PATCH TOPICAL ONCE
Status: DISCONTINUED | OUTPATIENT
Start: 2025-07-24 | End: 2025-07-24 | Stop reason: HOSPADM

## 2025-07-24 RX ORDER — ACETAMINOPHEN 325 MG/1
975 TABLET ORAL ONCE
Status: COMPLETED | OUTPATIENT
Start: 2025-07-24 | End: 2025-07-24

## 2025-07-24 RX ORDER — TIZANIDINE HYDROCHLORIDE 2 MG/1
2 CAPSULE, GELATIN COATED ORAL 3 TIMES DAILY PRN
Qty: 15 CAPSULE | Refills: 0 | Status: SHIPPED | OUTPATIENT
Start: 2025-07-24 | End: 2025-07-29

## 2025-07-24 RX ADMIN — ACETAMINOPHEN 975 MG: 325 TABLET ORAL at 11:40

## 2025-07-24 RX ADMIN — IBUPROFEN 400 MG: 200 TABLET, FILM COATED ORAL at 11:40

## 2025-07-24 RX ADMIN — LIDOCAINE 1 PATCH: 4 PATCH TOPICAL at 11:40

## 2025-07-24 ASSESSMENT — PAIN SCALES - GENERAL
PAINLEVEL_OUTOF10: 6
PAINLEVEL_OUTOF10: 8

## 2025-07-24 ASSESSMENT — PAIN DESCRIPTION - PAIN TYPE
TYPE: ACUTE PAIN
TYPE: ACUTE PAIN

## 2025-07-25 ENCOUNTER — CASE MANAGEMENT (OUTPATIENT)
Dept: URGENT CARE | Age: 30
End: 2025-07-25